# Patient Record
Sex: MALE
[De-identification: names, ages, dates, MRNs, and addresses within clinical notes are randomized per-mention and may not be internally consistent; named-entity substitution may affect disease eponyms.]

---

## 2019-10-23 ENCOUNTER — APPOINTMENT (OUTPATIENT)
Dept: PHYSICAL MEDICINE AND REHAB | Facility: CLINIC | Age: 34
End: 2019-10-23
Payer: MEDICAID

## 2019-10-23 VITALS — HEIGHT: 68 IN | WEIGHT: 116.7 LBS | BODY MASS INDEX: 17.69 KG/M2

## 2019-10-23 DIAGNOSIS — M47.812 SPONDYLOSIS W/OUT MYELOPATHY OR RADICULOPATHY, CERVICAL REGION: ICD-10-CM

## 2019-10-23 PROBLEM — Z00.00 ENCOUNTER FOR PREVENTIVE HEALTH EXAMINATION: Status: ACTIVE | Noted: 2019-10-23

## 2019-10-23 PROCEDURE — 99203 OFFICE O/P NEW LOW 30 MIN: CPT

## 2019-10-28 NOTE — PHYSICAL EXAM
[Normal] : Oriented to person, place, and time, insight and judgement were intact and the affect was normal [de-identified] : no gross deformity, no TTP over the bilateral cervical paraspinals, FROM limited in all planes, negative Spurling's, negative Alan's\par

## 2019-10-28 NOTE — HISTORY OF PRESENT ILLNESS
[FreeTextEntry1] : Patient presents for initial evaluation of neck weakness. Patient has history of anoxic brain injury and history is provided from mother who is present. He is able to follow commands with her assistance. His mother reports he has difficulty raising his head and keeps his head down. She states he does not admit to any pain. He had an xray of his C spine showing multi level degenerative changes.

## 2019-10-28 NOTE — ASSESSMENT
[FreeTextEntry1] : Recommend patient see Dr. Wesley Santiago of [facility] as he specializes in brain injury. I provided a prescription for PT for his C spine for strengthening in the interim.

## 2022-12-06 ENCOUNTER — APPOINTMENT (OUTPATIENT)
Dept: NEUROSURGERY | Facility: CLINIC | Age: 37
End: 2022-12-06

## 2022-12-06 ENCOUNTER — NON-APPOINTMENT (OUTPATIENT)
Age: 37
End: 2022-12-06

## 2022-12-06 VITALS
TEMPERATURE: 97.9 F | WEIGHT: 130 LBS | OXYGEN SATURATION: 98 % | BODY MASS INDEX: 19.26 KG/M2 | SYSTOLIC BLOOD PRESSURE: 121 MMHG | DIASTOLIC BLOOD PRESSURE: 80 MMHG | HEIGHT: 69 IN | HEART RATE: 69 BPM

## 2022-12-06 DIAGNOSIS — G93.1 ANOXIC BRAIN DAMAGE, NOT ELSEWHERE CLASSIFIED: ICD-10-CM

## 2022-12-06 DIAGNOSIS — R41.89 OTHER SYMPTOMS AND SIGNS INVOLVING COGNITIVE FUNCTIONS AND AWARENESS: ICD-10-CM

## 2022-12-06 PROCEDURE — 99203 OFFICE O/P NEW LOW 30 MIN: CPT

## 2022-12-06 RX ORDER — QUETIAPINE 200 MG/1
200 TABLET, FILM COATED ORAL
Refills: 0 | Status: ACTIVE | COMMUNITY

## 2022-12-06 NOTE — ASSESSMENT
[FreeTextEntry1] : 38 y/o male with PMHx of anoxic brain injury 5 yrs ago who presented today with his mother/ HCP as referral from . He is dependent of ADLs, wheelchair bound when out of the house. He has been following with psychiatrist Dr. Song at [facility] for agitation episodes and is currently on standing seroquel 200mg nightly with 50mg prn and was told to follow- up for evaluation/ medication management recommendations. \par \par Recommended that he see Dr. Cat Rosales for neuropsych evaluation. Referral provided. \par \par Patient's mother verbalizes understanding of today’s discussion and next steps in treatment plan. \par

## 2022-12-06 NOTE — PHYSICAL EXAM
[General Appearance - Alert] : alert [General Appearance - In No Acute Distress] : in no acute distress [Sclera] : the sclera and conjunctiva were normal [Outer Ear] : the ears and nose were normal in appearance [Examination Of The Oral Cavity] : the lips and gums were normal [Neck Appearance] : the appearance of the neck was normal [] : no respiratory distress [Respiration, Rhythm And Depth] : normal respiratory rhythm and effort [Skin Color & Pigmentation] : normal skin color and pigmentation [FreeTextEntry1] : Sitting in wheelchair.  [FreeTextEntry5] : Follows simple commands, antigravity all extremities

## 2022-12-06 NOTE — HISTORY OF PRESENT ILLNESS
[de-identified] : 38 y/o male with PMHx of anoxic brain injury 5 yrs ago who presents today as referral from . \par \par Pt is accompanied by his mother Marco who is his HCP and assists in HPI/ROS. \par \par Per mother- patient has been following with psychiatrist Dr. Song at [facility] for agitation episodes. He is currently on standing seroquel 200mg nightly with 50mg prn. They were told to follow- up for evaluation, recommendations, medication management. \par Patient is dependent of his mother of ADLs. He is able to stand with assistance and communicate his needs to her such as bowel/bladder needs, but with intermittent agitation.

## 2023-02-13 ENCOUNTER — APPOINTMENT (OUTPATIENT)
Age: 38
End: 2023-02-13